# Patient Record
Sex: MALE | Race: ASIAN | Employment: FULL TIME | URBAN - METROPOLITAN AREA
[De-identification: names, ages, dates, MRNs, and addresses within clinical notes are randomized per-mention and may not be internally consistent; named-entity substitution may affect disease eponyms.]

---

## 2017-04-10 PROCEDURE — 99283 EMERGENCY DEPT VISIT LOW MDM: CPT

## 2017-04-10 PROCEDURE — 96372 THER/PROPH/DIAG INJ SC/IM: CPT

## 2017-04-11 ENCOUNTER — HOSPITAL ENCOUNTER (EMERGENCY)
Age: 27
Discharge: HOME | End: 2017-04-11
Attending: EMERGENCY MEDICINE
Payer: SELF-PAY

## 2017-04-11 VITALS
WEIGHT: 164.9 LBS | HEART RATE: 55 BPM | SYSTOLIC BLOOD PRESSURE: 118 MMHG | HEIGHT: 68 IN | OXYGEN SATURATION: 99 % | DIASTOLIC BLOOD PRESSURE: 68 MMHG | BODY MASS INDEX: 24.99 KG/M2 | TEMPERATURE: 98 F | RESPIRATION RATE: 16 BRPM

## 2017-04-11 DIAGNOSIS — N50.811 PAIN IN RIGHT TESTICLE: ICD-10-CM

## 2017-04-11 DIAGNOSIS — Z71.1 CONCERN ABOUT STD IN MALE WITHOUT DIAGNOSIS: Primary | ICD-10-CM

## 2017-04-11 LAB
APPEARANCE UR: CLEAR
BACTERIA URNS QL MICRO: NEGATIVE /HPF
BILIRUB UR QL: NEGATIVE
COLOR UR: NORMAL
EPITH CASTS URNS QL MICRO: NORMAL /LPF
GLUCOSE UR STRIP.AUTO-MCNC: NEGATIVE MG/DL
HGB UR QL STRIP: NEGATIVE
HYALINE CASTS URNS QL MICRO: NORMAL /LPF (ref 0–5)
KETONES UR QL STRIP.AUTO: NEGATIVE MG/DL
LEUKOCYTE ESTERASE UR QL STRIP.AUTO: NEGATIVE
NITRITE UR QL STRIP.AUTO: NEGATIVE
PH UR STRIP: 6 [PH] (ref 5–8)
PROT UR STRIP-MCNC: NEGATIVE MG/DL
RBC #/AREA URNS HPF: NORMAL /HPF (ref 0–5)
SP GR UR REFRACTOMETRY: 1.02 (ref 1–1.03)
UA: UC IF INDICATED,UAUC: NORMAL
UROBILINOGEN UR QL STRIP.AUTO: 1 EU/DL (ref 0.2–1)
WBC URNS QL MICRO: NORMAL /HPF (ref 0–4)

## 2017-04-11 PROCEDURE — 74011250636 HC RX REV CODE- 250/636: Performed by: EMERGENCY MEDICINE

## 2017-04-11 PROCEDURE — 74011250637 HC RX REV CODE- 250/637: Performed by: EMERGENCY MEDICINE

## 2017-04-11 PROCEDURE — 74011000250 HC RX REV CODE- 250: Performed by: EMERGENCY MEDICINE

## 2017-04-11 PROCEDURE — 81001 URINALYSIS AUTO W/SCOPE: CPT | Performed by: EMERGENCY MEDICINE

## 2017-04-11 PROCEDURE — 87491 CHLMYD TRACH DNA AMP PROBE: CPT | Performed by: EMERGENCY MEDICINE

## 2017-04-11 RX ORDER — METRONIDAZOLE 250 MG/1
2000 TABLET ORAL
Status: COMPLETED | OUTPATIENT
Start: 2017-04-11 | End: 2017-04-11

## 2017-04-11 RX ORDER — ONDANSETRON 4 MG/1
4 TABLET, ORALLY DISINTEGRATING ORAL
Status: COMPLETED | OUTPATIENT
Start: 2017-04-11 | End: 2017-04-11

## 2017-04-11 RX ORDER — AZITHROMYCIN 250 MG/1
1000 TABLET, FILM COATED ORAL
Status: COMPLETED | OUTPATIENT
Start: 2017-04-11 | End: 2017-04-11

## 2017-04-11 RX ADMIN — AZITHROMYCIN 1000 MG: 250 TABLET, FILM COATED ORAL at 01:52

## 2017-04-11 RX ADMIN — ONDANSETRON 4 MG: 4 TABLET, ORALLY DISINTEGRATING ORAL at 01:52

## 2017-04-11 RX ADMIN — METRONIDAZOLE 2000 MG: 250 TABLET ORAL at 02:41

## 2017-04-11 RX ADMIN — LIDOCAINE HYDROCHLORIDE 250 MG: 10 INJECTION, SOLUTION EPIDURAL; INFILTRATION; INTRACAUDAL; PERINEURAL at 02:42

## 2017-04-11 NOTE — ED NOTES
Patient not c/o SOB or itching at this time. The physician has reviewed discharge instructions with the patient. The patient verbalized understanding. Patient ambulated out of emergency department.

## 2017-04-11 NOTE — ED PROVIDER NOTES
HPI Comments: Ivonne Simmonds is a 32 y.o. male who presents ambulatory to the ED c/o progressively worsening pain in the lower portion of his right testicle x 2 days. Pt reports additional unusual gray \"cloudy\" color to his semen. Pt notes taking Tylenol with no relief of sxs. He notes he is currently sexually active and requests empiric tx for GC / chlamydia. He specifically denies any penile drainage, lesions, urinary symptoms or recent trauma to his groin. Pt specifically denies nausea, vomiting, SOB, CP, fever, or chills. Social hx: - Tobacco use, - EtOH use, - Illicit drug use    PCP: None    There are no other complaints, changes or physical findings at this time. The history is provided by the patient. No  was used. No past medical history on file. No past surgical history on file. No family history on file. Social History     Social History    Marital status: SINGLE     Spouse name: N/A    Number of children: N/A    Years of education: N/A     Occupational History    Not on file. Social History Main Topics    Smoking status: Not on file    Smokeless tobacco: Not on file    Alcohol use Not on file    Drug use: Not on file    Sexual activity: Not on file     Other Topics Concern    Not on file     Social History Narrative         ALLERGIES: Aspirin    Review of Systems   Constitutional: Negative for chills and fever. HENT: Negative. Eyes: Negative. Respiratory: Negative for cough, chest tightness and shortness of breath. Cardiovascular: Negative for chest pain and leg swelling. Gastrointestinal: Negative for abdominal pain, diarrhea, nausea and vomiting. Endocrine: Negative. Genitourinary: Positive for testicular pain. Negative for difficulty urinating, discharge, dysuria, frequency, hematuria, penile pain, penile swelling and scrotal swelling.        + \"cloudy\" and gray-colored semen   Musculoskeletal: Negative for myalgias.    Skin: Negative. Neurological: Negative. Psychiatric/Behavioral: Negative. All other systems reviewed and are negative. Vitals:    04/10/17 2345 04/11/17 0254   BP: 121/68 118/68   Pulse: (!) 58 (!) 55   Resp: 14 16   Temp: 97.8 °F (36.6 °C) 98 °F (36.7 °C)   SpO2: 100% 99%   Weight: 74.8 kg (164 lb 14.5 oz)    Height: 5' 8\" (1.727 m)             Physical Exam   Constitutional: He is oriented to person, place, and time. He appears well-developed and well-nourished. No distress. HENT:   Head: Normocephalic and atraumatic. Nose: Nose normal.   Mouth/Throat: No oropharyngeal exudate. Eyes: Conjunctivae and EOM are normal. Pupils are equal, round, and reactive to light. Neck: Normal range of motion. Neck supple. No JVD present. Cardiovascular: Regular rhythm, normal heart sounds and intact distal pulses. Bradycardia present. Exam reveals no friction rub. No murmur heard. Pulmonary/Chest: Effort normal and breath sounds normal. No stridor. No respiratory distress. He has no wheezes. He has no rales. Abdominal: Soft. Bowel sounds are normal. He exhibits no distension. There is no tenderness. There is no rebound. Genitourinary: Testes normal and penis normal. Right testis shows no mass, no swelling and no tenderness. Left testis shows no mass, no swelling and no tenderness. Uncircumcised. No penile erythema or penile tenderness. No discharge found. Musculoskeletal: Normal range of motion. He exhibits no tenderness. Neurological: He is alert and oriented to person, place, and time. No cranial nerve deficit. Skin: Skin is warm and dry. No rash noted. He is not diaphoretic. Psychiatric: He has a normal mood and affect. His behavior is normal. Judgment and thought content normal.   Nursing note and vitals reviewed.        MDM  Number of Diagnoses or Management Options  Concern about STD in male without diagnosis:   Pain in right testicle:   Diagnosis management comments: DDX:  Uti, epididymitis, sti    Plan:  Ua, cultures, empiric tx of sti    Impression:  R testicle pain, concern for STI without dx         Amount and/or Complexity of Data Reviewed  Clinical lab tests: ordered and reviewed  Review and summarize past medical records: yes    Patient Progress  Patient progress: stable      Procedures    I reviewed our electronic medical record system for any past medical records that were available that may contribute to the patients current condition, the nursing notes and and vital signs from today's visit    Nursing notes will be reviewed as they become available in realtime while the pt has been in the ED. Randal Green MD    7431  Pt's rate is 62 with a NS rhythm as noted on Cardiac monitor. SpO2 is 100, on (RA)    3:08 AM  Progress note:  Pt noted to be feeling better, ready for discharge. Discussed lab findings with pt and/or family. Will follow up as instructed. All questions have been answered, pt voiced understanding and agreement with plan. If narcotics were prescribed, pt was advised not to drive or operate heavy machinery. If abx were prescribed, pt advised that diarrhea and rash are possible side effects of the medications. Specific return precautions provided as well as instructions to return to the ED should sx worsen at any time.   Randal Green MD    LABORATORY TESTS:  Recent Results (from the past 12 hour(s))   URINALYSIS W/ REFLEX CULTURE    Collection Time: 04/11/17  1:51 AM   Result Value Ref Range    Color YELLOW/STRAW      Appearance CLEAR CLEAR      Specific gravity 1.022 1.003 - 1.030      pH (UA) 6.0 5.0 - 8.0      Protein NEGATIVE  NEG mg/dL    Glucose NEGATIVE  NEG mg/dL    Ketone NEGATIVE  NEG mg/dL    Bilirubin NEGATIVE  NEG      Blood NEGATIVE  NEG      Urobilinogen 1.0 0.2 - 1.0 EU/dL    Nitrites NEGATIVE  NEG      Leukocyte Esterase NEGATIVE  NEG      WBC 0-4 0 - 4 /hpf    RBC 0-5 0 - 5 /hpf    Epithelial cells FEW FEW /lpf    Bacteria NEGATIVE  NEG /hpf UA: UC IF INDICATED CULTURE NOT INDICATED BY UA RESULT CNI      Hyaline cast 0-2 0 - 5 /lpf       MEDICATIONS GIVEN:  Medications   cefTRIAXone (ROCEPHIN) 250 mg in lidocaine (PF) (XYLOCAINE) 10 mg/mL (1 %) IM injection (250 mg IntraMUSCular Given 4/11/17 0242)   metroNIDAZOLE (FLAGYL) tablet 2,000 mg (2,000 mg Oral Given 4/11/17 0241)   azithromycin (ZITHROMAX) tablet 1,000 mg (1,000 mg Oral Given 4/11/17 0152)   ondansetron (ZOFRAN ODT) tablet 4 mg (4 mg Oral Given 4/11/17 0152)       IMPRESSION:  1. Concern about STD in male without diagnosis    2. Pain in right testicle        PLAN:  1. There are no discharge medications for this patient. 2.   Follow-up Information     Follow up With Details Comments [address] Department  As needed [address]   [phone]    [facility] [place] [facility]  As needed [address]  6[address] [facility]  [phone]        Return to ED if worse     DISCHARGE NOTE  3:08 AM  The patient has been re-evaluated and is ready for discharge. Reviewed available results with patient. Counseled pt on diagnosis and care plan. Pt has expressed understanding, and all questions have been answered. Pt agrees with plan and agrees to F/U as recommended, or return to the ED if their sxs worsen. Discharge instructions have been provided and explained to the pt, along with reasons to return to the ED. This note is prepared by Sarah Rascon, acting as Scribe for Franci Walker MD.    Franci Walker MD: The scribe's documentation has been prepared under my direction and personally reviewed by me in its entirety. I confirm that the note above accurately reflects all work, treatment, procedures, and medical decision making performed by me. This note will not be viewable in [address]

## 2017-04-11 NOTE — Clinical Note
Local Primary Care Physicians [facility] [phone] Isidro Morelle, MD Eula Lennox, MD  
 
[facility] [phone] Gurpreet Bruner, [facility] Elida Abdul, MD John Schulz, MD Deonna Irwin MD  
 
[address] [facility] [phone] MD Hosea Marcos MD 
 
[facility] [phone] MD Maddie Diaz, MD Julianna Singh, MD Fang Kelley MD  
 
[facility] [phone] 1 Adolfo Padgett, MD Izabela Oseguera, MD Candance Smart, NP [address] [phone] MD Angy Zhang MD Durwin Flemings, MD Alejandrina Stout, MD Anh Mclean, MD Rosie Dietrich, MD Amara Cortez, [facility]. [place] [address] [phone] Clinton Oconnell MD 
 
[facility] [phone] MD Elsy Elder, NP Rogelio Rose, MD Myriam Garrett, MD Judi Robles, MD Valdo Najera, MD [address] [phone] MD Akil Villegas, P Umesh Smith, NP  
Garrick Salgado, MD Hosea Jacques, MD Jannette Enriquez MD 
 
[facility] [phone] MD Sergio Holloway,   Henrique Hansen, MD Lopez Middleton, MD Elsie Wolfe MD  
 
[address] [phone] MD Krish King MD [place] [phone] MD Farzaneh Kitchen MD Karlyn Costain, M D Ritprabhjot Aid [phone] Conrado Cm, MD Yolanda Mendez, MD Luz Hugo, MD Praveen Howell, MD Lynnette Broussard, NP Braeden Alvarado MD  
 
[address] [phone] [facility] jairo, MD Donnie Juan MD [facility] [phone] [facility] Joanne, MD Rafael Chandler, GABRIELA Rowley, FNP Geraldene Ike, PA-C Darell Moron, MD Leopoldo Levins, NP Abdirahman Sellers,  Miscellaneous:  
 
[facility] Departments For adult and child immunizations, family planning, TB screening, STD testing and women's health services. [place]: Hao [phone]  [facility] [phone] [facility]   [phone] [place]   [phone] Omid Healy   [phone] [place]: SARAVIA [phone] [place] [phone] [place] [phone] [facility] For primary care services, woman and child wellness, and some clinics providing specialty care. [facility] -- [facility] [facility]. [address] [place] [address] [phone] [facility] [phone] [facility] [address] [phone] [address] [place] [address] Drive [address]  Community  [phone] 7709 [facility] [phone] [address] [phone] [facility]  [address], 809 [facility] Clinic: [facility] [address], ext 320 1509 [facility], [address] [phone] [facility] 37 [phone] [address]  [phone] [place] [facility] [employer]  [address], [phone] 3550 [facility] ([email or web address]) [phone] Sexual Health/Woman Wellness Clinics For STD/HIV testing and treatment, pregnancy testing and services, men's health, birth control services, LGBT services, and hepatitis/HPV vaccine services. [employer] for [employer] [address] [facility] [phone] [facility] of [address] Mari Sarah [phone] [address] Mari Sraah [phone] [address], 5th floor [phone] [facility] of [facility] [facility] 427 [facility] [address]. Lynda [employer] [phone] Specialty Service Clinics [address] [employer] [address] [address] [phone] [address] [facility] [address]   [phone] Benjamin [employer]   [phone] Women, Infant and Children's Services: [address] [phone] [address] [employer] [phone] [facility]   [phone] [address] [facility] Psychiatry     [phone] 1325 [facility]   [phone] 560 [facility] [phone] Thank you for allowing us to provide you with excellent care t onur. We hope we addressed all of your concerns and needs. We strive to provide excellent quality care in the Emergency Department. Please rate us as excellent, as anything less than excellent does not meet our expectations. If you feel that you have  not received excellent quality care or timely care, please ask to speak to the nurse manager. Please choose us in the future for your continued health care needs. The exam and treatment you received in the Emergency Department were for an urgent  problem and are not intended as complete care. It is important that you follow up with a doctor, nurse practitioner, or physician assistant for ongoing care.  If your symptoms become worse or you do not improve as expected and you are unable to reach you r usual health care provider, you should return to the Emergency Department. We are available 24 hours a day. Take this sheet with you when you go to your follow-up visit. If you have any problem arranging the follow-up visit, contact the Emergency D epartment immediately. If a prescription has been provided, please have it filled as soon as possible to avoid a delay in treatment. Read the entire medication instruction sheet provided to you by the pharmacy. If you have any questions or reservat ions about taking the medication due to side effects or interactions with other medications please call the ER or your primary care physician. Take this sheet with you when you go to your follow-up visit. Make an appointment with your family doct or or the physician you were referred to for follow-up of this visit, as this is mandatory follow-up. Return to the ER if you are unable to be seen or if you are unable to be seen in a timely manner. If you have any problem arranging the follow-up vi sit, contact the Emergency Department immediately.

## 2017-04-11 NOTE — LETTER
[address] 
[facility] EMERGENCY DEPT 
[address] [place] [address] [address] [phone] Work/School Note Date: 4/10/2017 To Whom It May concern: 
 
Kimberlee Hammond was seen and treated today in the emergency room by the following provider(s): 
Attending Provider: Fiorella Carbajal MD. Kimberlee Hammond may return to work on 4/12/17.  
 
Sincerely, 
 
 
 
 
Fiorella Carbajal MD

## 2017-04-12 LAB
C TRACH DNA SPEC QL NAA+PROBE: NEGATIVE
N GONORRHOEA DNA SPEC QL NAA+PROBE: NEGATIVE
SAMPLE TYPE: NORMAL
SERVICE CMNT-IMP: NORMAL
SPECIMEN SOURCE: NORMAL

## 2019-07-08 ENCOUNTER — HOSPITAL ENCOUNTER (EMERGENCY)
Age: 29
Discharge: HOME | End: 2019-07-09
Attending: EMERGENCY MEDICINE | Admitting: EMERGENCY MEDICINE
Payer: SELF-PAY

## 2019-07-08 ENCOUNTER — APPOINTMENT (OUTPATIENT)
Dept: GENERAL RADIOLOGY | Age: 29
End: 2019-07-08
Attending: EMERGENCY MEDICINE
Payer: SELF-PAY

## 2019-07-08 DIAGNOSIS — R10.13 ABDOMINAL PAIN, EPIGASTRIC: Primary | ICD-10-CM

## 2019-07-08 LAB
ALBUMIN SERPL-MCNC: 4.4 G/DL (ref 3.5–5)
ALBUMIN/GLOB SERPL: 1.3 {RATIO} (ref 1.1–2.2)
ALP SERPL-CCNC: 69 U/L (ref 45–117)
ALT SERPL-CCNC: 24 U/L (ref 12–78)
ANION GAP SERPL CALC-SCNC: 8 MMOL/L (ref 5–15)
AST SERPL-CCNC: 16 U/L (ref 15–37)
BASOPHILS # BLD: 0 K/UL (ref 0–0.1)
BASOPHILS NFR BLD: 0 % (ref 0–1)
BILIRUB SERPL-MCNC: 2.2 MG/DL (ref 0.2–1)
BUN SERPL-MCNC: 18 MG/DL (ref 6–20)
BUN/CREAT SERPL: 20 (ref 12–20)
CALCIUM SERPL-MCNC: 9.3 MG/DL (ref 8.5–10.1)
CHLORIDE SERPL-SCNC: 101 MMOL/L (ref 97–108)
CO2 SERPL-SCNC: 26 MMOL/L (ref 21–32)
CREAT SERPL-MCNC: 0.88 MG/DL (ref 0.7–1.3)
DIFFERENTIAL METHOD BLD: ABNORMAL
EOSINOPHIL # BLD: 0.1 K/UL (ref 0–0.4)
EOSINOPHIL NFR BLD: 1 % (ref 0–7)
ERYTHROCYTE [DISTWIDTH] IN BLOOD BY AUTOMATED COUNT: 12.2 % (ref 11.5–14.5)
GLOBULIN SER CALC-MCNC: 3.5 G/DL (ref 2–4)
GLUCOSE SERPL-MCNC: 81 MG/DL (ref 65–100)
HCT VFR BLD AUTO: 45.7 % (ref 36.6–50.3)
HGB BLD-MCNC: 15.7 G/DL (ref 12.1–17)
IMM GRANULOCYTES # BLD AUTO: 0.1 K/UL (ref 0–0.04)
IMM GRANULOCYTES NFR BLD AUTO: 1 % (ref 0–0.5)
LIPASE SERPL-CCNC: 135 U/L (ref 73–393)
LYMPHOCYTES # BLD: 1.1 K/UL (ref 0.8–3.5)
LYMPHOCYTES NFR BLD: 10 % (ref 12–49)
MCH RBC QN AUTO: 30 PG (ref 26–34)
MCHC RBC AUTO-ENTMCNC: 34.4 G/DL (ref 30–36.5)
MCV RBC AUTO: 87.2 FL (ref 80–99)
MONOCYTES # BLD: 0.6 K/UL (ref 0–1)
MONOCYTES NFR BLD: 6 % (ref 5–13)
NEUTS SEG # BLD: 9 K/UL (ref 1.8–8)
NEUTS SEG NFR BLD: 82 % (ref 32–75)
NRBC # BLD: 0 K/UL (ref 0–0.01)
NRBC BLD-RTO: 0 PER 100 WBC
PLATELET # BLD AUTO: 231 K/UL (ref 150–400)
PMV BLD AUTO: 9.5 FL (ref 8.9–12.9)
POTASSIUM SERPL-SCNC: 3.6 MMOL/L (ref 3.5–5.1)
PROT SERPL-MCNC: 7.9 G/DL (ref 6.4–8.2)
RBC # BLD AUTO: 5.24 M/UL (ref 4.1–5.7)
SODIUM SERPL-SCNC: 135 MMOL/L (ref 136–145)
WBC # BLD AUTO: 10.8 K/UL (ref 4.1–11.1)

## 2019-07-08 PROCEDURE — 96374 THER/PROPH/DIAG INJ IV PUSH: CPT

## 2019-07-08 PROCEDURE — 96375 TX/PRO/DX INJ NEW DRUG ADDON: CPT

## 2019-07-08 PROCEDURE — 74011250637 HC RX REV CODE- 250/637: Performed by: EMERGENCY MEDICINE

## 2019-07-08 PROCEDURE — 74022 RADEX COMPL AQT ABD SERIES: CPT

## 2019-07-08 PROCEDURE — 74011250636 HC RX REV CODE- 250/636: Performed by: EMERGENCY MEDICINE

## 2019-07-08 PROCEDURE — 85025 COMPLETE CBC W/AUTO DIFF WBC: CPT

## 2019-07-08 PROCEDURE — 99283 EMERGENCY DEPT VISIT LOW MDM: CPT

## 2019-07-08 PROCEDURE — 74011000250 HC RX REV CODE- 250: Performed by: EMERGENCY MEDICINE

## 2019-07-08 PROCEDURE — 36415 COLL VENOUS BLD VENIPUNCTURE: CPT

## 2019-07-08 PROCEDURE — 80053 COMPREHEN METABOLIC PANEL: CPT

## 2019-07-08 PROCEDURE — 83690 ASSAY OF LIPASE: CPT

## 2019-07-08 PROCEDURE — 96361 HYDRATE IV INFUSION ADD-ON: CPT

## 2019-07-08 RX ORDER — FAMOTIDINE 10 MG/ML
20 INJECTION INTRAVENOUS
Status: COMPLETED | OUTPATIENT
Start: 2019-07-08 | End: 2019-07-08

## 2019-07-08 RX ORDER — SODIUM CHLORIDE 0.9 % (FLUSH) 0.9 %
5-40 SYRINGE (ML) INJECTION EVERY 8 HOURS
Status: DISCONTINUED | OUTPATIENT
Start: 2019-07-08 | End: 2019-07-09 | Stop reason: HOSPADM

## 2019-07-08 RX ORDER — ONDANSETRON 2 MG/ML
4 INJECTION INTRAMUSCULAR; INTRAVENOUS
Status: COMPLETED | OUTPATIENT
Start: 2019-07-08 | End: 2019-07-08

## 2019-07-08 RX ORDER — SODIUM CHLORIDE 0.9 % (FLUSH) 0.9 %
5-40 SYRINGE (ML) INJECTION AS NEEDED
Status: DISCONTINUED | OUTPATIENT
Start: 2019-07-08 | End: 2019-07-09 | Stop reason: HOSPADM

## 2019-07-08 RX ADMIN — FAMOTIDINE 20 MG: 10 INJECTION, SOLUTION INTRAVENOUS at 22:21

## 2019-07-08 RX ADMIN — ALUMINUM HYDROXIDE AND MAGNESIUM HYDROXIDE 40 ML: 200; 200 SUSPENSION ORAL at 22:21

## 2019-07-08 RX ADMIN — Medication 10 ML: at 22:23

## 2019-07-08 RX ADMIN — SODIUM CHLORIDE 1000 ML: 900 INJECTION, SOLUTION INTRAVENOUS at 22:22

## 2019-07-08 RX ADMIN — ONDANSETRON 4 MG: 2 INJECTION INTRAMUSCULAR; INTRAVENOUS at 22:21

## 2019-07-08 NOTE — ED TRIAGE NOTES
The patient arrives to triage, ambulatory with a steady gait. The patient reports going to a clinic in [place], for having a panic attack. Reports not having a health check up in a long time. Reports thinking it was indigestion, states for two weeks would have heartburn and panic attacks. States going to the doctor and was given Pepcid and Buspar on Wednesday. Reports today having bad heart burn and vomiting. States calling the doctor and they said to take two pepcid without relief.

## 2019-07-09 VITALS
HEART RATE: 116 BPM | DIASTOLIC BLOOD PRESSURE: 47 MMHG | BODY MASS INDEX: 23.57 KG/M2 | WEIGHT: 154.98 LBS | TEMPERATURE: 98.2 F | RESPIRATION RATE: 20 BRPM | OXYGEN SATURATION: 99 % | SYSTOLIC BLOOD PRESSURE: 101 MMHG

## 2019-07-09 PROCEDURE — 74011250636 HC RX REV CODE- 250/636: Performed by: EMERGENCY MEDICINE

## 2019-07-09 PROCEDURE — 96361 HYDRATE IV INFUSION ADD-ON: CPT

## 2019-07-09 PROCEDURE — 96375 TX/PRO/DX INJ NEW DRUG ADDON: CPT

## 2019-07-09 RX ORDER — METOCLOPRAMIDE 10 MG/1
10 TABLET ORAL
Qty: 20 TAB | Refills: 0 | Status: SHIPPED | OUTPATIENT
Start: 2019-07-09 | End: 2019-07-14

## 2019-07-09 RX ORDER — FAMOTIDINE 20 MG/1
20 TABLET, FILM COATED ORAL 2 TIMES DAILY
Qty: 60 TAB | Refills: 1 | Status: SHIPPED | OUTPATIENT
Start: 2019-07-09

## 2019-07-09 RX ORDER — LIDOCAINE HYDROCHLORIDE 20 MG/ML
10 SOLUTION OROPHARYNGEAL AS NEEDED
Qty: 1 BOTTLE | Refills: 0 | Status: SHIPPED | OUTPATIENT
Start: 2019-07-09

## 2019-07-09 RX ORDER — METOCLOPRAMIDE HYDROCHLORIDE 5 MG/ML
10 INJECTION INTRAMUSCULAR; INTRAVENOUS
Status: COMPLETED | OUTPATIENT
Start: 2019-07-09 | End: 2019-07-09

## 2019-07-09 RX ADMIN — METOCLOPRAMIDE 10 MG: 5 INJECTION, SOLUTION INTRAMUSCULAR; INTRAVENOUS at 00:36

## 2019-07-09 NOTE — DISCHARGE INSTRUCTIONS
Patient Education        Abdominal Pain: Care Instructions  Your Care Instructions    Abdominal pain has many possible causes. Some aren't serious and get better on their own in a few days. Others need more testing and treatment. If your pain continues or gets worse, you need to be rechecked and may need more tests to find out what is wrong. You may need surgery to correct the problem. Don't ignore new symptoms, such as fever, nausea and vomiting, urination problems, pain that gets worse, and dizziness. These may be signs of a more serious problem. Your doctor may have recommended a follow-up visit in the next 8 to 12 hours. If you are not getting better, you may need more tests or treatment. The doctor has checked you carefully, but problems can develop later. If you notice any problems or new symptoms, get medical treatment right away. Follow-up care is a key part of your treatment and safety. Be sure to make and go to all appointments, and call your doctor if you are having problems. It's also a good idea to know your test results and keep a list of the medicines you take. How can you care for yourself at home? · Rest until you feel better. · To prevent dehydration, drink plenty of fluids, enough so that your urine is light yellow or clear like water. Choose water and other caffeine-free clear liquids until you feel better. If you have kidney, heart, or liver disease and have to limit fluids, talk with your doctor before you increase the amount of fluids you drink. · If your stomach is upset, eat mild foods, such as rice, dry toast or crackers, bananas, and applesauce. Try eating several small meals instead of two or three large ones. · Wait until 48 hours after all symptoms have gone away before you have spicy foods, alcohol, and drinks that contain caffeine. · Do not eat foods that are high in fat. · Avoid anti-inflammatory medicines such as aspirin, ibuprofen (Advil, Motrin), and naproxen (Aleve). These can cause stomach upset. Talk to your doctor if you take daily aspirin for another health problem. When should you call for help? Call 911 anytime you think you may need emergency care. For example, call if:    · You passed out (lost consciousness).     · You pass maroon or very bloody stools.     · You vomit blood or what looks like coffee grounds.     · You have new, severe belly pain.    Call your doctor now or seek immediate medical care if:    · Your pain gets worse, especially if it becomes focused in one area of your belly.     · You have a new or higher fever.     · Your stools are black and look like tar, or they have streaks of blood.     · You have unexpected vaginal bleeding.     · You have symptoms of a urinary tract infection. These may include:  ? Pain when you urinate. ? Urinating more often than usual.  ? Blood in your urine.     · You are dizzy or lightheaded, or you feel like you may faint.    Watch closely for changes in your health, and be sure to contact your doctor if:    · You are not getting better after 1 day (24 hours). Where can you learn more? Go to http://zayra-dwain.info/. Enter C415 in the search box to learn more about \"Abdominal Pain: Care Instructions. \"  Current as of: September 23, 2018  Content Version: 11.9  © 6397-4198 [employer]. Care instructions adapted under license by [employer] (which disclaims liability or warranty for this information). If you have questions about a medical condition or this instruction, always ask your healthcare professional. [place] [address] any warranty or liability for your use of this information. Patient Education        Learning About Acid-Reducing Medicines  What are they? Acid-reducing medicines can help relieve heartburn and other symptoms of indigestion. They can help prevent damage to your digestive system from stomach acids.  They also are used to treat reflux and ulcer symptoms. These medicines include H2 blockers and proton pump inhibitors (PPIs). They help your stomach make less acid. You can buy them over the counter. Some of them also come in prescription strengths. Antacids can also help relieve heartburn symptoms. They reduce the acid that is already in your stomach. You can buy them over the counter. Which medicine is best for you depends on what is causing your symptoms. How do they work? Acid-reducing medicines work in two ways. H2 blockers and proton pump inhibitors (PPIs) lower the amount of acid your stomach makes. They don't work on the acid that's already there. Antacids work by making stomach juices less acidic. But your heartburn may come back as your stomach makes more acid. What are some examples? Examples of acid reducers include:  H2 blockers. Tagamet  Pepcid  Axid  Zantac  Proton pump inhibitors (PPIs). Nexium  Prevacid  Prilosec  Zegerid  Protonix  Aciphex  Antacids. Gaviscon  Mylanta  Maalox  Tums  What are side effects might you have? Many people don't have side effects. And minor side effects might go away after a while. H2 blockers can cause headaches or make you dizzy. They might cause diarrhea or constipation. You may have nausea and vomiting. PPIs can cause headaches and diarrhea. Using them for a long time may raise your risk for infections or broken bones. Some antacids can cause constipation or diarrhea. The brands vary in the ingredients they use. They can have different side effects. If you use too much heartburn medicine, your body may not get enough of some minerals from your food. How can you take these medicines safely? Some H2 blockers and PPIs can affect how other medicines work. Tell your doctor if you use other medicines. He or she may change the dose or give you a different medicine. Many antacids have aspirin in them. Read the label to make sure that you don't take too much.  Too much aspirin can be harmful. Be safe with medicines. Take your medicines exactly as prescribed. If you take over-the-counter medicine, be sure to read and follow all instructions on the label. Call your doctor if you think you are having a problem with your medicine. Check with your doctor or pharmacist before you use any other medicines. This includes over-the-counter medicines. Tell your doctor about all of the medicines, vitamins, herbal products, and supplements you take. Taking some medicines together can cause problems. Follow-up care is a key part of your treatment and safety. Be sure to make and go to all appointments, and call your doctor if you are having problems. It's also a good idea to know your test results and keep a list of the medicines you take. Where can you learn more? Go to http://zayra-dwain.info/. Enter [phone] in the search box to learn more about \"Learning About Acid-Reducing Medicines. \"  Current as of: March 27, 2018  Content Version: 11.9  © 7553-4701 [employer]. Care instructions adapted under license by [employer] (which disclaims liability or warranty for this information). If you have questions about a medical condition or this instruction, always ask your healthcare professional. [place] [address] any warranty or liability for your use of this information. Patient Education        Indigestion (Dyspepsia or Heartburn): Care Instructions  Your Care Instructions  Sometimes it can be hard to pinpoint the cause of indigestion. (It is also called dyspepsia or heartburn.) Most cases of an upset stomach with bloating, burning, burping, and nausea are minor and go away within several hours. Home treatment and over-the-counter medicine often are able to control symptoms. But if you take medicine to relieve your indigestion without making diet and lifestyle changes, your symptoms are likely to return again and again.   If you get indigestion often, it may be a sign of a more serious medical problem. Be sure to follow up with your doctor, who may want to do tests to be sure of the cause of your indigestion. Follow-up care is a key part of your treatment and safety. Be sure to make and go to all appointments, and call your doctor if you are having problems. It's also a good idea to know your test results and keep a list of the medicines you take. How can you care for yourself at home? · Your doctor may recommend over-the-counter medicine. For mild or occasional indigestion, antacids such as Gaviscon, Mylanta, Maalox, or Tums, may help. Be safe with medicines. Be careful when you take over-the-counter antacid medicines. Many of these medicines have aspirin in them. Read the label to make sure that you are not taking more than the recommended dose. Too much aspirin can be harmful. · Your doctor also may recommend over-the-counter acid reducers, such as Pepcid AC, Tagamet HB, Zantac 75, or Prilosec. Read and follow all instructions on the label. If you use these medicines often, talk with your doctor. · Change your eating habits. ? It's best to eat several small meals instead of two or three large meals. ? After you eat, wait 2 to 3 hours before you lie down. ? Chocolate, mint, and alcohol can make GERD worse. ? Spicy foods, foods that have a lot of acid (like tomatoes and oranges), and coffee can make GERD symptoms worse in some people. If your symptoms are worse after you eat a certain food, you may want to stop eating that food to see if your symptoms get better. · Do not smoke or chew tobacco. Smoking can make GERD worse. If you need help quitting, talk to your doctor about stop-smoking programs and medicines. These can increase your chances of quitting for good. · If you have GERD symptoms at night, raise the head of your bed 6 to 8 inches. You can do this by putting the frame on blocks or placing a foam wedge under the head of your mattress. (Adding extra pillows does not work.)  · Do not wear tight clothing around your middle. · Lose weight if you need to. Losing just 5 to 10 pounds can help. · Do not take anti-inflammatory medicines, such as aspirin, ibuprofen (Advil, Motrin), or naproxen (Aleve). These can irritate the stomach. If you need a pain medicine, try acetaminophen (Tylenol), which does not cause stomach upset. When should you call for help? Call your doctor now or seek immediate medical care if:    · You have new or worse belly pain.     · You are vomiting.    Watch closely for changes in your health, and be sure to contact your doctor if:    · You have new or worse symptoms of indigestion.     · You have trouble or pain swallowing.     · You are losing weight.     · You do not get better as expected. Where can you learn more? Go to http://zayra-dwain.info/. Enter Z082 in the search box to learn more about \"Indigestion (Dyspepsia or Heartburn): Care Instructions. \"  Current as of: March 27, 2018  Content Version: 11.9  © 1874-2506 [employer]. Care instructions adapted under license by [employer] (which disclaims liability or warranty for this information). If you have questions about a medical condition or this instruction, always ask your healthcare professional. [address] any warranty or liability for your use of this information. Make sure that you are taking your antacid medication every day. Advance diet as tolerated start with bland such as bananas rice applesauce toast potato. A prescription has been written for you for the lidocaine this can be mixed in Maalox Mylanta or Gaviscon and can be used every 4 hours as needed for abdominal discomfort in the epigastric region.

## 2019-07-09 NOTE — ED PROVIDER NOTES
EMERGENCY DEPARTMENT HISTORY AND PHYSICAL EXAM      Date: 7/8/2019  Patient Name: Christiano Atwood    History of Presenting Illness     Chief Complaint   Patient presents with    Epigastric Pain       History Provided By: Patient    HPI: Christiano Atwood, 29 y.o. male with no significant past medical history, presents by POV to the ED with cc of epigastric abdominal pain. Patient states for the past 2 weeks he has been having epigastric discomfort along with nausea and occasional vomiting. Patient states initially couple weeks ago he had started with constipation and that has slowly started to resolve. Patient states most recently he has been laying down in the middle the night and being woken up by pain and discomfort in his chest described as a burning sensation which unfortunately wraps up his anxiety. Patient states he is primarily throwing up stomach acid only at this point. Patient states he is also noticed with his bowel movements they are much more liquid and ascitic. Patient denies any fever or chills. Patient denies any previous history of GI issues as well as familial history. Patient states he had been seen recently and given a prescription for both BuSpar as well as Zantac. Patient states he has been taking both but has not noticed any improvement in his symptoms however this is only been for the last couple days. Patient states the pain discomfort is described as a burning rated a 7 out of 10 currently with no alleviating factors. Patient states any type of food etc. escalates his discomfort. There is been no black tarry stool or gross blood in his stool he is aware of. There are no other complaints, changes, or physical findings at this time. PCP: None    No current facility-administered medications on file prior to encounter. No current outpatient medications on file prior to encounter. Past History     Past Medical History:  No past medical history on file.     Past Surgical History:  No past surgical history on file. Family History:  No family history on file. Social History:  Social History     Tobacco Use    Smoking status: Not on file   Substance Use Topics    Alcohol use: Not on file    Drug use: Not on file       Allergies: Allergies   Allergen Reactions    Aspirin Unknown (comments)         Review of Systems   Review of Systems   Constitutional: Negative. Negative for appetite change, chills, fatigue and fever. HENT: Negative. Negative for congestion, rhinorrhea, sinus pressure and sore throat. Eyes: Negative. Respiratory: Negative. Negative for cough, choking, chest tightness, shortness of breath and wheezing. Cardiovascular: Negative. Negative for chest pain, palpitations and leg swelling. Gastrointestinal: Positive for abdominal pain, diarrhea, nausea and vomiting. Negative for constipation. Endocrine: Negative. Genitourinary: Negative. Negative for difficulty urinating, dysuria, flank pain and urgency. Musculoskeletal: Negative. Skin: Negative. Neurological: Negative. Negative for dizziness, speech difficulty, weakness, light-headedness, numbness and headaches. Psychiatric/Behavioral: Negative. All other systems reviewed and are negative. Physical Exam   Physical Exam   Constitutional: He is oriented to person, place, and time. He appears well-developed and well-nourished. No distress. HENT:   Head: Normocephalic and atraumatic. Mouth/Throat: No oropharyngeal exudate. Dry mucous membranes   Eyes: Pupils are equal, round, and reactive to light. Conjunctivae and EOM are normal.   Neck: Normal range of motion. Neck supple. No JVD present. No tracheal deviation present. Cardiovascular: Regular rhythm, normal heart sounds and intact distal pulses. No murmur heard. Tachycardic   Pulmonary/Chest: Effort normal and breath sounds normal. No stridor. No respiratory distress. He has no wheezes. He has no rales.  He exhibits no tenderness. Abdominal: Soft. He exhibits no distension. There is tenderness. There is no rebound and no guarding. Musculoskeletal: Normal range of motion. He exhibits no edema or tenderness. Neurological: He is alert and oriented to person, place, and time. No cranial nerve deficit. No gross motor or sensory deficits    Skin: Skin is warm and dry. He is not diaphoretic. Psychiatric: He has a normal mood and affect. His behavior is normal.   Nursing note and vitals reviewed. Diagnostic Study Results     Labs -     Contains abnormal data CBC WITH AUTOMATED DIFF (Final result)    Component (Lab Inquiry)   Collection Time Result Time WBC RBC HGB HCT MCV   07/08/19 22:05:00 07/08/19 22:11:58 10.8 5.24 15.7 45.7 87.2       Collection Time Result Time MCH MCHC RDW PLT MPLV   07/08/19 22:05:00 07/08/19 22:11:58 30.0 34.4 12.2 231 9.5       Collection Time Result Time NRBC ANRBC GRANS LYMPH MONOS   07/08/19 22:05:00 07/08/19 22:11:58 0.0 0.00 82High  10Low  6       Collection Time Result Time EOS BASOS IG ABG ABL   07/08/19 22:05:00 07/08/19 22:11:58 1 0 1High  9.0High  1.1       Collection Time Result Time ABM MARCIE ABB AIG DF   07/08/19 22:05:00 07/08/19 22:11:58 0.6 0.1 0.0 0.1High  AUTOMATED       Final result                        Contains abnormal data METABOLIC PANEL, COMPREHENSIVE (Final result)    Component (Lab Inquiry)   Collection Time Result Time NA K CL CO2 AGAP   07/08/19 22:05:00 07/08/19 23:11:45 135Low  3.6 101 26 8       Collection Time Result Time GLU BUN CREA BUCR GFRAA   07/08/19 22:05:00 07/08/19 23:11:45 81 18 0.88 20 >60       Collection Time Result Time GFRNA CA TBIL GPT SGOT   07/08/19 22:05:00 07/08/19 23:11:45 >60  Estimated GFR is calcu. .. 9.3 2.2High  24 16       Collection Time Result Time AP TP ALB GLOB AGRAT   07/08/19 22:05:00 07/08/19 23:11:45 69 7.9 4.4 3.5 1.3       Final result                        LIPASE (Final result)    Component (Lab Inquiry)   Collection Time Result Time LPSE   07/08/19 22:05:00 07/08/19 23:11:45 135         Final result             Radiologic Studies -   XR ABD ACUTE W 1 V CHEST   Final Result   IMPRESSION:  No acute disease. CT Results  (Last 48 hours)    None        CXR Results  (Last 48 hours)               07/08/19 1955  XR ABD ACUTE W 1 V CHEST Final result    Impression:  IMPRESSION:  No acute disease. Narrative:  EXAM: Supine and upright views of the abdomen and a frontal view of the chest   are provided. INDICATION:  heartburn, epigastric pain       The bowel gas pattern is nonobstructive and unremarkable. There is no free   intraperitoneal air. There is no evident organomegaly or significant   intraabdominal calcification. The lungs are free of acute disease and significant findings. Heart size is   normal and there is no overt pulmonary edema. Medical Decision Making   I am the first provider for this patient. I reviewed the vital signs, available nursing notes, past medical history, past surgical history, family history and social history. Vital Signs-Reviewed the patient's vital signs. Patient Vitals for the past 12 hrs:   Temp Pulse Resp BP SpO2   07/08/19 1908 98.2 °F (36.8 °C) (!) 116 20 125/79 100 %       Pulse Oximetry Analysis - 100% on RA      Records Reviewed: Nursing Notes and Old Medical Records    Provider Notes (Medical Decision Making):   DDx-gastritis, peptic ulcer disease, pancreatitis, constipation, dehydration    ED Course:   Initial assessment performed. The patients presenting problems have been discussed, and they are in agreement with the care plan formulated and outlined with them. I have encouraged them to ask questions as they arise throughout their visit. Per father pt had been playing soccer, but about a month ago he sustained an injury to his knee and he not returned since.  Based off description there has  Been locking, no effusion, likely cartilage tear. He has not gone back to playing because he is afraid he might hurt. Unclear possible underlying psychological component with depression and anxious about his overall health. Critical Care Time:   None    Disposition:  DC    PLAN:  1. Discharge Medication List as of 7/9/2019 12:49 AM      START taking these medications    Details   famotidine (PEPCID) 20 mg tablet Take 1 Tab by mouth two (2) times a day., Print, Disp-60 Tab, R-1      lidocaine (XYLOCAINE) 2 % solution Take 10 mL by mouth as needed for Pain., Print, Disp-1 Bottle, R-0      metoclopramide HCl (REGLAN) 10 mg tablet Take 1 Tab by mouth Before breakfast, lunch, dinner and at bedtime for 5 days. , Print, Disp-20 Tab, R-0           2. Follow-up Information     Follow up With Specialties Details Why Contact Info    None   As needed None ([phone]) Patient stated that they have no PCP          Return to ED if worse     Diagnosis     Clinical Impression:   1.  Abdominal pain, epigastric